# Patient Record
Sex: FEMALE | Race: WHITE | URBAN - METROPOLITAN AREA
[De-identification: names, ages, dates, MRNs, and addresses within clinical notes are randomized per-mention and may not be internally consistent; named-entity substitution may affect disease eponyms.]

---

## 2023-03-31 ENCOUNTER — TELEPHONE (OUTPATIENT)
Dept: OBGYN CLINIC | Facility: HOSPITAL | Age: 63
End: 2023-03-31

## 2023-03-31 NOTE — TELEPHONE ENCOUNTER
Caller: Sister    Doctor: Hand/wrist surgeon    Reason for call: Sister calling stating that her sister is away in [facility] for a wedding and sustained a distal fracture of radius  Sister calling to schedule appointment with hand/wrist surgeon  Warm transfer to hand/wrist         Call back#: N/A